# Patient Record
Sex: FEMALE | Race: WHITE | URBAN - NONMETROPOLITAN AREA
[De-identification: names, ages, dates, MRNs, and addresses within clinical notes are randomized per-mention and may not be internally consistent; named-entity substitution may affect disease eponyms.]

---

## 2021-02-19 ENCOUNTER — OFFICE VISIT (OUTPATIENT)
Dept: URBAN - NONMETROPOLITAN AREA CLINIC 13 | Facility: CLINIC | Age: 36
End: 2021-02-19

## 2022-01-28 ENCOUNTER — WEB ENCOUNTER (OUTPATIENT)
Dept: URBAN - NONMETROPOLITAN AREA CLINIC 13 | Facility: CLINIC | Age: 37
End: 2022-01-28

## 2022-01-28 ENCOUNTER — OFFICE VISIT (OUTPATIENT)
Dept: URBAN - NONMETROPOLITAN AREA CLINIC 13 | Facility: CLINIC | Age: 37
End: 2022-01-28
Payer: COMMERCIAL

## 2022-01-28 ENCOUNTER — LAB OUTSIDE AN ENCOUNTER (OUTPATIENT)
Dept: URBAN - NONMETROPOLITAN AREA CLINIC 13 | Facility: CLINIC | Age: 37
End: 2022-01-28

## 2022-01-28 ENCOUNTER — TELEPHONE ENCOUNTER (OUTPATIENT)
Dept: URBAN - NONMETROPOLITAN AREA CLINIC 1 | Facility: CLINIC | Age: 37
End: 2022-01-28

## 2022-01-28 VITALS
HEIGHT: 66 IN | SYSTOLIC BLOOD PRESSURE: 122 MMHG | TEMPERATURE: 97.8 F | BODY MASS INDEX: 26.45 KG/M2 | WEIGHT: 164.6 LBS | DIASTOLIC BLOOD PRESSURE: 82 MMHG | HEART RATE: 84 BPM

## 2022-01-28 DIAGNOSIS — D18.03 LIVER HEMANGIOMA: ICD-10-CM

## 2022-01-28 DIAGNOSIS — Z86.010 PERSONAL HISTORY OF COLONIC POLYPS: ICD-10-CM

## 2022-01-28 DIAGNOSIS — K29.60 ADENOPAPILLOMATOSIS GASTRICA: ICD-10-CM

## 2022-01-28 PROCEDURE — 99244 OFF/OP CNSLTJ NEW/EST MOD 40: CPT | Performed by: NURSE PRACTITIONER

## 2022-01-28 PROCEDURE — 99204 OFFICE O/P NEW MOD 45 MIN: CPT | Performed by: NURSE PRACTITIONER

## 2022-01-28 RX ORDER — SODIUM PICOSULFATE, MAGNESIUM OXIDE, AND ANHYDROUS CITRIC ACID 10; 3.5; 12 MG/160ML; G/160ML; G/160ML
160 ML LIQUID ORAL
Qty: 320 MILLILITER | Refills: 0 | OUTPATIENT
Start: 2022-01-28 | End: 2022-01-29

## 2022-01-28 RX ORDER — NORELGESTROMIN AND ETHINYL ESTRADIOL 150; 35 UG/D; UG/D
PATCH TRANSDERMAL
Qty: 3 | Refills: 0 | Status: ACTIVE | COMMUNITY

## 2022-01-28 NOTE — HPI-TODAY'S VISIT:
1/28/2022 Test is a 36-year-old female referred to me by Dr. Clemente Jenkins for consultation of personal history of colon polyps and liver hemangioma.  A copy of this note will be sent to the referring physician.  In 2016 her mother was diagnosed with a rectal neuroendocrine tumor at age 54.  She was evaluated by Dr. Petersen status post EGD with gastritis and colonoscopy with a benign polyp however he did recommend to repeat her colonoscopy in 5 years.  We do not have these records.  She states her bowels are moving normal.  Her gastritis is well managed so long as she avoids dairy.  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  She has a history of liver hemangiomas, prior to having her daughter she had one on the left lobe that was less than a centimeter, recently she repeated her ultrasound and she has a 3.7 cm hemangioma.  She is on birth control with an estrogen-based transdermal patch.  This has grown substantially in 3 years.  It is above 3.5 cm.  She is concerned about this, we have discussed contributing factors including estrogen-based oral contraception.  At this point she agrees to discuss with her gynecologist about progesterone-based therapy.  She is due for repeat colonoscopy.  We have discussed risks and benefits and she agrees to proceed.  MB

## 2022-02-11 ENCOUNTER — OFFICE VISIT (OUTPATIENT)
Dept: URBAN - NONMETROPOLITAN AREA CLINIC 13 | Facility: CLINIC | Age: 37
End: 2022-02-11

## 2022-03-22 ENCOUNTER — TELEPHONE ENCOUNTER (OUTPATIENT)
Dept: URBAN - METROPOLITAN AREA CLINIC 92 | Facility: CLINIC | Age: 37
End: 2022-03-22

## 2022-03-22 ENCOUNTER — WEB ENCOUNTER (OUTPATIENT)
Dept: URBAN - NONMETROPOLITAN AREA CLINIC 13 | Facility: CLINIC | Age: 37
End: 2022-03-22

## 2022-03-22 ENCOUNTER — LAB OUTSIDE AN ENCOUNTER (OUTPATIENT)
Dept: URBAN - METROPOLITAN AREA CLINIC 92 | Facility: CLINIC | Age: 37
End: 2022-03-22

## 2022-04-26 ENCOUNTER — WEB ENCOUNTER (OUTPATIENT)
Dept: URBAN - NONMETROPOLITAN AREA SURGERY CENTER 1 | Facility: SURGERY CENTER | Age: 37
End: 2022-04-26

## 2022-04-28 ENCOUNTER — WEB ENCOUNTER (OUTPATIENT)
Dept: URBAN - NONMETROPOLITAN AREA SURGERY CENTER 1 | Facility: SURGERY CENTER | Age: 37
End: 2022-04-28

## 2022-05-02 ENCOUNTER — OFFICE VISIT (OUTPATIENT)
Dept: URBAN - NONMETROPOLITAN AREA SURGERY CENTER 1 | Facility: SURGERY CENTER | Age: 37
End: 2022-05-02
Payer: COMMERCIAL

## 2022-05-02 DIAGNOSIS — Z80.0 BROTHER AT YOUNG AGE FAMILY HISTORY OF COLON CANCER: ICD-10-CM

## 2022-05-02 DIAGNOSIS — Z86.010 ADENOMAS PERSONAL HISTORY OF COLONIC POLYPS: ICD-10-CM

## 2022-05-02 PROCEDURE — G8907 PT DOC NO EVENTS ON DISCHARG: HCPCS | Performed by: INTERNAL MEDICINE

## 2022-05-02 PROCEDURE — 45378 DIAGNOSTIC COLONOSCOPY: CPT | Performed by: INTERNAL MEDICINE

## 2022-05-26 ENCOUNTER — TELEPHONE ENCOUNTER (OUTPATIENT)
Dept: URBAN - METROPOLITAN AREA CLINIC 92 | Facility: CLINIC | Age: 37
End: 2022-05-26

## 2022-07-22 ENCOUNTER — OFFICE VISIT (OUTPATIENT)
Dept: URBAN - NONMETROPOLITAN AREA CLINIC 2 | Facility: CLINIC | Age: 37
End: 2022-07-22

## 2022-07-22 RX ORDER — NORELGESTROMIN AND ETHINYL ESTRADIOL 150; 35 UG/D; UG/D
PATCH TRANSDERMAL
Qty: 3 | Refills: 0 | Status: ACTIVE | COMMUNITY

## 2022-07-29 ENCOUNTER — OFFICE VISIT (OUTPATIENT)
Dept: URBAN - NONMETROPOLITAN AREA CLINIC 2 | Facility: CLINIC | Age: 37
End: 2022-07-29

## 2022-12-16 ENCOUNTER — OFFICE VISIT (OUTPATIENT)
Dept: URBAN - NONMETROPOLITAN AREA CLINIC 2 | Facility: CLINIC | Age: 37
End: 2022-12-16
Payer: COMMERCIAL

## 2022-12-16 ENCOUNTER — DASHBOARD ENCOUNTERS (OUTPATIENT)
Age: 37
End: 2022-12-16

## 2022-12-16 ENCOUNTER — LAB OUTSIDE AN ENCOUNTER (OUTPATIENT)
Dept: URBAN - NONMETROPOLITAN AREA CLINIC 2 | Facility: CLINIC | Age: 37
End: 2022-12-16

## 2022-12-16 VITALS
DIASTOLIC BLOOD PRESSURE: 85 MMHG | WEIGHT: 165 LBS | HEART RATE: 90 BPM | TEMPERATURE: 97.8 F | BODY MASS INDEX: 26.52 KG/M2 | SYSTOLIC BLOOD PRESSURE: 132 MMHG | HEIGHT: 66 IN

## 2022-12-16 DIAGNOSIS — Z80.0 FAMILY HISTORY OF RECTAL CANCER: ICD-10-CM

## 2022-12-16 DIAGNOSIS — Z86.010 PERSONAL HISTORY OF COLONIC POLYPS: ICD-10-CM

## 2022-12-16 DIAGNOSIS — K29.70 GASTRITIS WITHOUT BLEEDING, UNSPECIFIED CHRONICITY, UNSPECIFIED GASTRITIS TYPE: ICD-10-CM

## 2022-12-16 DIAGNOSIS — D18.03 LIVER HEMANGIOMA: ICD-10-CM

## 2022-12-16 PROBLEM — 4556007: Status: ACTIVE | Noted: 2022-01-28

## 2022-12-16 PROBLEM — 428283002: Status: ACTIVE | Noted: 2022-01-28

## 2022-12-16 PROBLEM — 456441000124100: Status: ACTIVE | Noted: 2022-01-28

## 2022-12-16 PROBLEM — 93469006: Status: ACTIVE | Noted: 2022-01-28

## 2022-12-16 PROCEDURE — 99214 OFFICE O/P EST MOD 30 MIN: CPT | Performed by: NURSE PRACTITIONER

## 2022-12-16 RX ORDER — NORELGESTROMIN AND ETHINYL ESTRADIOL 150; 35 UG/D; UG/D
PATCH TRANSDERMAL
Qty: 3 | Refills: 0 | Status: ON HOLD | COMMUNITY

## 2022-12-16 RX ORDER — DROSPIRENONE 4 MG/1
1 TABLET TABLET, FILM COATED ORAL ONCE A DAY
Status: ACTIVE | COMMUNITY

## 2022-12-16 RX ORDER — DOXYCYCLINE HYCLATE 50 MG/1
2 CAPSULES CAPSULE, GELATIN COATED ORAL TWICE A DAY
Status: ACTIVE | COMMUNITY

## 2022-12-16 RX ORDER — LEVOCETIRIZINE DIHYDROCHLORIDE 5 MG/1
1 TABLET IN THE EVENING TABLET ORAL ONCE A DAY
Status: ACTIVE | COMMUNITY

## 2022-12-16 NOTE — HPI-TODAY'S VISIT:
1/28/2022 Test is a 36-year-old female referred to me by Dr. Clemente Jenkins for consultation of personal history of colon polyps and liver hemangioma.  A copy of this note will be sent to the referring physician.  In 2016 her mother was diagnosed with a rectal neuroendocrine tumor at age 54.  She was evaluated by Dr. Petersen status post EGD with gastritis and colonoscopy with a benign polyp however he did recommend to repeat her colonoscopy in 5 years.  We do not have these records.  She states her bowels are moving normal.  Her gastritis is well managed so long as she avoids dairy.  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  She has a history of liver hemangiomas, prior to having her daughter she had one on the left lobe that was less than a centimeter, recently she repeated her ultrasound and she has a 3.7 cm hemangioma.  She is on birth control with an estrogen-based transdermal patch.  This has grown substantially in 3 years.  It is above 3.5 cm.  She is concerned about this, we have discussed contributing factors including estrogen-based oral contraception.  At this point she agrees to discuss with her gynecologist about progesterone-based therapy.  She is due for repeat colonoscopy.  We have discussed risks and benefits and she agrees to proceed.  MB 12/16/2022 Genny presents for follow-up of gastritis, colonoscopy, and liver lesion.  Since her last visit her CT shows a stable hemangioma.  She is switched to a progesterone-based ART birth control.  She is due for repeat ultrasound.  Her gastritis flares if she eats dairy, we have discussed using Pepcid as needed.  Her colonoscopy is normal and she will be due for repeat in 5 years.  Today she is doing well otherwise with no new GI complaints.  MB